# Patient Record
Sex: MALE | Race: BLACK OR AFRICAN AMERICAN | Employment: FULL TIME | URBAN - METROPOLITAN AREA
[De-identification: names, ages, dates, MRNs, and addresses within clinical notes are randomized per-mention and may not be internally consistent; named-entity substitution may affect disease eponyms.]

---

## 2017-02-17 ENCOUNTER — APPOINTMENT (OUTPATIENT)
Dept: PHYSICAL THERAPY | Age: 27
End: 2017-02-17

## 2018-12-06 ENCOUNTER — OFFICE VISIT (OUTPATIENT)
Dept: FAMILY MEDICINE CLINIC | Age: 28
End: 2018-12-06

## 2018-12-06 VITALS — WEIGHT: 201 LBS | HEIGHT: 69 IN | OXYGEN SATURATION: 99 % | BODY MASS INDEX: 29.77 KG/M2 | RESPIRATION RATE: 17 BRPM

## 2018-12-06 DIAGNOSIS — F41.0 PANIC ATTACK: ICD-10-CM

## 2018-12-06 DIAGNOSIS — F41.1 GAD (GENERALIZED ANXIETY DISORDER): Primary | ICD-10-CM

## 2018-12-06 RX ORDER — BUSPIRONE HYDROCHLORIDE 5 MG/1
5 TABLET ORAL 2 TIMES DAILY
Qty: 60 TAB | Refills: 1 | Status: SHIPPED | OUTPATIENT
Start: 2018-12-06

## 2018-12-06 RX ORDER — HYDROXYZINE PAMOATE 25 MG/1
25 CAPSULE ORAL
COMMUNITY
End: 2018-12-06

## 2018-12-06 NOTE — PROGRESS NOTES
Camille Castro is a 29 y.o. male presents to office for establish care and anxiety Medication list has been reviewed with Camille Castro and updated as of today's date Health Maintenance items with a due date reviewed with patient: 
Health Maintenance Due Topic Date Due  
 DTaP/Tdap/Td series (1 - Tdap) 07/04/2011

## 2018-12-06 NOTE — PROGRESS NOTES
Debbi Valente Chief Complaint Patient presents with  Establish Care  Anxiety  Sleep Problem Vitals:  
 12/06/18 5823 Resp: 17 SpO2: 99% Weight: 201 lb (91.2 kg) Height: 5' 9\" (1.753 m) PainSc:   0 - No pain HPI: 29years old patient he is here for anxiety symptoms for the last 2 weeks, he has been having episodes of anxiety but has been worsening over the last 2 weeks, been having palpitation shortness of breath abdominal pain nausea, the symptoms has been recurrent he was seen at the urgent care over 2 weeks for a cold and sinusitis, and then a few days later his are having symptoms of the anxiety he went back to urgent care and was given Vistaril to help his symptoms much. Patient did have couple episodes of panic attack where he had shortness of breath and palpitation. He is aware of any precipitating factor for his anxiety and his panic attack. Psychiatric disorder no history of depression or personality disorder. History reviewed. No pertinent past medical history. History reviewed. No pertinent surgical history. Social History Tobacco Use  Smoking status: Never Smoker  Smokeless tobacco: Never Used Substance Use Topics  Alcohol use: Yes Alcohol/week: 1.8 oz Types: 3 Cans of beer per week Comment: occasionally Family History Problem Relation Age of Onset  Anxiety Father Review of Systems Constitutional: Negative for chills, fever, malaise/fatigue and weight loss. HENT: Positive for congestion and sinus pain. Negative for ear discharge, ear pain, hearing loss, nosebleeds and sore throat. Eyes: Negative for blurred vision, double vision and discharge. Respiratory: Positive for shortness of breath. Negative for cough, hemoptysis, sputum production and wheezing. Cardiovascular: Positive for palpitations. Negative for chest pain, claudication and leg swelling. Gastrointestinal: Positive for abdominal pain. Negative for constipation, diarrhea, nausea and vomiting. Genitourinary: Negative for dysuria, frequency and urgency. Musculoskeletal: Negative for myalgias. Skin: Negative for itching and rash. Neurological: Negative for dizziness, tingling, sensory change, speech change, focal weakness, weakness and headaches. Psychiatric/Behavioral: Negative for depression and suicidal ideas. The patient is nervous/anxious and has insomnia. Physical Exam  
Constitutional: He is oriented to person, place, and time. He appears well-developed and well-nourished. No distress. HENT:  
Head: Normocephalic and atraumatic. Mouth/Throat: No oropharyngeal exudate. Eyes: Conjunctivae are normal. Pupils are equal, round, and reactive to light. Right eye exhibits no discharge. Left eye exhibits no discharge. No scleral icterus. Neck: Normal range of motion. Neck supple. No thyromegaly present. Cardiovascular: Normal rate, regular rhythm and normal heart sounds. Pulmonary/Chest: Effort normal and breath sounds normal. No respiratory distress. He has no rales. Abdominal: Soft. Bowel sounds are normal. He exhibits no distension and no mass. There is no tenderness. There is no rebound. Musculoskeletal: Normal range of motion. He exhibits no edema, tenderness or deformity. Lymphadenopathy:  
  He has no cervical adenopathy. Neurological: He is alert and oriented to person, place, and time. No cranial nerve deficit. Coordination normal.  
Skin: Skin is warm and dry. No rash noted. He is not diaphoretic. No erythema. Psychiatric: He has a normal mood and affect. Judgment and thought content normal.  
Nursing note and vitals reviewed. Assessment and plan I have discussed with patient in length symptoms of anxiety and different treatment options including anxiolytics and SSRIs, and psychotherapy. Patient was given BuSpar to take it twice daily And to be referred to psychology for cognitive behavioral therapy. Patient agreed with the plan and verbalized understanding and all his questions were answered and more over half of the visit was spent counseling the patient about his diagnosis. 1. SHARRON (generalized anxiety disorder) - REFERRAL TO PSYCHOLOGY 
- busPIRone (BUSPAR) 5 mg tablet; Take 1 Tab by mouth two (2) times a day. Dispense: 60 Tab; Refill: 1 2. Panic attack 
 
- REFERRAL TO PSYCHOLOGY 
- busPIRone (BUSPAR) 5 mg tablet; Take 1 Tab by mouth two (2) times a day. Dispense: 60 Tab; Refill: 1 Current Outpatient Medications Medication Sig Dispense Refill  busPIRone (BUSPAR) 5 mg tablet Take 1 Tab by mouth two (2) times a day. 60 Tab 1 There are no active problems to display for this patient. Results for orders placed or performed during the hospital encounter of 09/01/17 METABOLIC PANEL, COMPREHENSIVE Result Value Ref Range Sodium 137 136 - 145 mEq/L Potassium 4.0 3.5 - 5.1 mEq/L Chloride 103 98 - 107 mEq/L  
 CO2 29 21 - 32 mEq/L Glucose 84 74 - 106 mg/dl BUN 12 7 - 25 mg/dl Creatinine 1.1 0.6 - 1.3 mg/dl GFR est AA >60    
 GFR est non-AA >60 Calcium 9.5 8.5 - 10.1 mg/dl AST (SGOT) 33 15 - 37 U/L  
 ALT (SGPT) 51 12 - 78 U/L Alk. phosphatase 82 45 - 117 U/L Bilirubin, total 0.7 0.2 - 1.0 mg/dl Protein, total 7.8 6.4 - 8.2 gm/dl Albumin 4.3 3.4 - 5.0 gm/dl C REACTIVE PROTEIN, QT Result Value Ref Range C-Reactive protein <2.9 0.0 - 2.9 mg/L  
SED RATE (ESR) Result Value Ref Range Sed rate (ESR) 4 0 - 15 mm/Hr No visits with results within 3 Month(s) from this visit. Latest known visit with results is:  
Hospital Outpatient Visit on 09/01/2017 Component Date Value Ref Range Status  Sodium 09/01/2017 137  136 - 145 mEq/L Final  
 Potassium 09/01/2017 4.0  3.5 - 5.1 mEq/L Final  
 Chloride 09/01/2017 103  98 - 107 mEq/L Final  
  CO2 09/01/2017 29  21 - 32 mEq/L Final  
 Glucose 09/01/2017 84  74 - 106 mg/dl Final  
 BUN 09/01/2017 12  7 - 25 mg/dl Final  
 Creatinine 09/01/2017 1.1  0.6 - 1.3 mg/dl Final  
 GFR est AA 09/01/2017 >60    Final  
 Comment: THE [facility] STATES THAT THE MDRD STUDY EQUATION SHOULD ONLY BE USED ON 
INDIVIDUALS 18 OR OLDER. THE REPORT ALSO NOTES THAT THE MDRD STUDY EQUATION HAS NOT BEEN 
VALIDATED FOR USE WITH THE ELDERLY (OVER 79YEARS OF AGE), PREGNANT WOMEN, PATIENTS WITH SERIOUS 
COMORBID CONDITIONS, OR PERSONS WITH EXTREMES OF BODY SIZE, MUSCLE MASS, OR NUTRITIONAL STATUS. APPLICATION OF THE EQUATION TO THESE PATIENT GROUPS MAY LEAD TO ERRORS IN GFR ESTIMATION. GFR 
ESTIMATING EQUATIONS HAVE POORER AGREEMENT WITH MEASURED GFR FOR ILL HOSPITALIZED PATIENTS AND FOR PEOPLE WITH NEAR NORMAL KIDNEY FUNCTION THAN FOR SUBJECTS IN THE MDRD STUDY. VALIDATION 
STUDIES ARE IN PROGRESS TO EVALUATE THE MDRD STUDY EQUATION FOR ADDITIONAL ETHNIC GROUPS, THE 
ELDERLY, VARIOUS DISEASE CONDITIONS, AND PEOPLE WITH NORMAL KIDNEY FUNCTION. [facility]RA----REFERS TO  
GFRO---REFERS TO OTHER RACES 
REFERENCES AVAILABLE UPON REQUEST. 
  
 GFR est non-AA 09/01/2017 >60    Final  
 Calcium 09/01/2017 9.5  8.5 - 10.1 mg/dl Final  
 AST (SGOT) 09/01/2017 33  15 - 37 U/L Final  
 ALT (SGPT) 09/01/2017 51  12 - 78 U/L Final  
 Alk.  phosphatase 09/01/2017 82  45 - 117 U/L Final  
 Bilirubin, total 09/01/2017 0.7  0.2 - 1.0 mg/dl Final  
 Protein, total 09/01/2017 7.8  6.4 - 8.2 gm/dl Final  
 Albumin 09/01/2017 4.3  3.4 - 5.0 gm/dl Final  
 C-Reactive protein 09/01/2017 <2.9  0.0 - 2.9 mg/L Final  
 Sed rate (ESR) 09/01/2017 4  0 - 15 mm/Hr Final  
  
 
 
Follow-up Disposition: 
Return in about 2 weeks (around 12/20/2018) for to schedule physical.